# Patient Record
(demographics unavailable — no encounter records)

---

## 2025-05-19 NOTE — HISTORY OF PRESENT ILLNESS
[FreeTextEntry1] : 51year old female here for an initial consultation for her newly diagnosed left breast cancer. She was referred by her friend who is a patient of mine. Aziza lives in [place]. PCP: Dr. 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 She has hypercholesterolemia. She saw the cardiologist in [place] for surgical clearance, she was told she no longer need to be on the Plavix and is on a baby aspirin 81 mg.  12/22/2023 Bilateral mammogram: There are scattered areas of fibroglandular density.  No suspicious masses, microcalcifications or architectural distortion.  BI-RADS 2 1/3/2025 Bilateral mammogram: There are scattered areas of fibroglandular density.  Focal asymmetry in the left middle upper breast with associated architectural distortion.  No suspicious findings in the right breast.  BI-RADS 0, advise left diagnostic mammogram and breast ultrasound. 1/14/2025 Left diagnostic mammogram: There is a persistent spiculated nodule in the central breast corresponding to the recent finding.  The axilla is unremarkable.   Left breast ultrasound: 11-12:00, 6 cm from the nipple, irregular nonparallel shadowing mass corresponding to the mammographic finding, 10 x 10 x 7 mm, highly suspicious for malignancy.  There is an axillary node with focal asymmetric thickening of a portion of the cortex of 3 mm, nonspecific.  BI-RADS 5, advise left ultrasound-guided core biopsy 3/6/2025 Left breast stereotactic guided core biopsy (X shaped clip) because it could not be localized on ultrasound. Pathology: -Invasive carcinoma of no special type (ductal), grade 1 -Largest invasive focus in this limited sample: at least 10 mm -ER+(51-90%)/VA+(11-50%)/Her2- Recommend surgical and oncological evaluations. 4/21/2025 Bilateral breast MRI:  Right breast: No suspicious mass or focal non-mass enhancement from presumed background parenchymal enhancement of scattered enhancing foci.  No abnormal enlarged lymph nodes in the right axilla or internal mammary chain. Left breast: An 9 x 7 x 7 mm enhancing masses in the left central slightly upper breast, middle depth, and corresponds to the mass on mammogram, biopsy-proven invasive carcinoma.  Associated focus of susceptibility artifact is from the biopsy clip.  Kinetics demonstrate initial medium and delayed washout enhancement pattern.  No other suspicious mass or focal non-mass enhancement distinct from the presumed background parenchymal enhancement seen in the remaining breast.  No abnormal enlarged lymph nodes seen in the left axilla or internal mammary chain. BI-RADS 6 4/23/25 PET/CT: Minimal uptake of the posterior central left breast mass, biopsy-proven primary malignancy. Multiple non to minimally FDG avid left level 1-2 axillary nodes are indeterminate. No FDG avid distant metastases. Prior breast surgeon sent a Mammaprint on core biopsy because initially biomarkers were not available. That showed luminal B, high risk, with benefit from chemotherapy. 4/2/25 [employer] genetic testing: VUS BARD1 She was previously using Zepbound, she stopped medication 1 week ago.  She has seen a breast surgeon (Dr. Gonzales) and medical oncologist (Dr. Altman) in [place] where she currently resides. She has family here in [place]. She is scheduled for a lumpectomy, sentinel lymph node biopsy there on 6/18/2025. She is here for a 2nd opinion. She came alone.

## 2025-05-19 NOTE — CONSULT LETTER
[Dear  ___] : Dear  [unfilled], [Consult Letter:] : I had the pleasure of evaluating your patient, [unfilled]. [Please see my note below.] : Please see my note below. [Consult Closing:] : Thank you very much for allowing me to participate in the care of this patient.  If you have any questions, please do not hesitate to contact me. [Sincerely,] : Sincerely, [FreeTextEntry3] : Susan M. Palleschi, MD, FACS Division of Breast Surgery Director, Breast Surgery [address] [address] (Phone) [phone] (Fax) [phone]

## 2025-05-19 NOTE — HISTORY OF PRESENT ILLNESS
[FreeTextEntry1] : 51year old female here for an initial consultation for her newly diagnosed left breast cancer. She was referred by her friend who is a patient of mine. Aziza lives in [place]. PCP: Dr. 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 She has hypercholesterolemia. She saw the cardiologist in [place] for surgical clearance, she was told she no longer need to be on the Plavix and is on a baby aspirin 81 mg.  12/22/2023 Bilateral mammogram: There are scattered areas of fibroglandular density.  No suspicious masses, microcalcifications or architectural distortion.  BI-RADS 2 1/3/2025 Bilateral mammogram: There are scattered areas of fibroglandular density.  Focal asymmetry in the left middle upper breast with associated architectural distortion.  No suspicious findings in the right breast.  BI-RADS 0, advise left diagnostic mammogram and breast ultrasound. 1/14/2025 Left diagnostic mammogram: There is a persistent spiculated nodule in the central breast corresponding to the recent finding.  The axilla is unremarkable.   Left breast ultrasound: 11-12:00, 6 cm from the nipple, irregular nonparallel shadowing mass corresponding to the mammographic finding, 10 x 10 x 7 mm, highly suspicious for malignancy.  There is an axillary node with focal asymmetric thickening of a portion of the cortex of 3 mm, nonspecific.  BI-RADS 5, advise left ultrasound-guided core biopsy 3/6/2025 Left breast stereotactic guided core biopsy (X shaped clip) because it could not be localized on ultrasound. Pathology: -Invasive carcinoma of no special type (ductal), grade 1 -Largest invasive focus in this limited sample: at least 10 mm -ER+(51-90%)/SC+(11-50%)/Her2- Recommend surgical and oncological evaluations. 4/21/2025 Bilateral breast MRI:  Right breast: No suspicious mass or focal non-mass enhancement from presumed background parenchymal enhancement of scattered enhancing foci.  No abnormal enlarged lymph nodes in the right axilla or internal mammary chain. Left breast: An 9 x 7 x 7 mm enhancing masses in the left central slightly upper breast, middle depth, and corresponds to the mass on mammogram, biopsy-proven invasive carcinoma.  Associated focus of susceptibility artifact is from the biopsy clip.  Kinetics demonstrate initial medium and delayed washout enhancement pattern.  No other suspicious mass or focal non-mass enhancement distinct from the presumed background parenchymal enhancement seen in the remaining breast.  No abnormal enlarged lymph nodes seen in the left axilla or internal mammary chain. BI-RADS 6 4/23/25 PET/CT: Minimal uptake of the posterior central left breast mass, biopsy-proven primary malignancy. Multiple non to minimally FDG avid left level 1-2 axillary nodes are indeterminate. No FDG avid distant metastases. Prior breast surgeon sent a Mammaprint on core biopsy because initially biomarkers were not available. That showed luminal B, high risk, with benefit from chemotherapy. 4/2/25 [employer] genetic testing: VUS BARD1 She was previously using Zepbound, she stopped medication 1 week ago.  She has seen a breast surgeon (Dr. Gonzales) and medical oncologist (Dr. Altman) in [place] where she currently resides. She has family here in [place]. She is scheduled for a lumpectomy, sentinel lymph node biopsy there on 6/18/2025. She is here for a 2nd opinion. She came alone.

## 2025-05-19 NOTE — PHYSICAL EXAM
[Normocephalic] : normocephalic [Atraumatic] : atraumatic [EOMI] : extra ocular movement intact [Sclera nonicteric] : sclera nonicteric [Supple] : supple [No Supraclavicular Adenopathy] : no supraclavicular adenopathy [No Cervical Adenopathy] : no cervical adenopathy [No Thyromegaly] : no thyromegaly [No JVD] : no jugular venous distension [Clear to Auscultation Bilat] : clear to auscultation bilaterally [Normal Sinus Rhythm] : normal sinus rhythm [Normal S1, S2] : normal S1 and S2 [No Gallops] : no gallops [No Rubs] : no pericardial rub [Examined in the supine and seated position] : examined in the supine and seated position [Bra Size: ___] : Bra Size: [unfilled] [No dominant masses] : no dominant masses in right breast  [No dominant masses] : no dominant masses left breast [No Nipple Retraction] : no left nipple retraction [No Nipple Discharge] : no left nipple discharge [No Axillary Lymphadenopathy] : no left axillary lymphadenopathy [No Edema] : no edema [No Rashes] : no rashes [No Ulceration] : no ulceration [Asymmetrical] : asymmetrical [de-identified] : right breast is larger than left. Bilateral ptosis.  [de-identified] : Core scar: left 6:30 (N6cm)

## 2025-05-19 NOTE — CONSULT LETTER
[Dear  ___] : Dear  [unfilled], [Consult Letter:] : I had the pleasure of evaluating your patient, [unfilled]. [Please see my note below.] : Please see my note below. [Consult Closing:] : Thank you very much for allowing me to participate in the care of this patient.  If you have any questions, please do not hesitate to contact me. [Sincerely,] : Sincerely, [FreeTextEntry3] : Susan M. Palleschi, MD, FACS Division of Breast Surgery Director, Breast Surgery [address] [address] (Phone) [phone] (Fax) [phone]

## 2025-05-19 NOTE — CONSULT LETTER
[Dear  ___] : Dear  [unfilled], [Consult Letter:] : I had the pleasure of evaluating your patient, [unfilled]. [Please see my note below.] : Please see my note below. [Consult Closing:] : Thank you very much for allowing me to participate in the care of this patient.  If you have any questions, please do not hesitate to contact me. [Sincerely,] : Sincerely, [FreeTextEntry3] : Susan M. Palleschi, MD, FACS Division of Breast Surgery Director, Breast Surgery [address] [address] (Phone) [phone] (Fax) [phone]

## 2025-05-19 NOTE — ASSESSMENT
[FreeTextEntry1] : Newly diagnosed left breast invasive ductal carcinoma, grade 1, ER+/WA+/Her2- Clinical Stage I    1. The surgical treatment options of a left Linda  localization wide excision lumpectomy, left axillary sentinel lymph node (LN) biopsy, possible left axillary LN dissection followed by post-operative XRT (to reduce risk of local recurrence), vs.  left total mastectomy, left axillary sentinel LN biopsy, possible left axillary LN dissection, vs. bilateral total mastectomies, left axillary sentinel LN biopsy. injection of prophylactic breast with Magtrace, if she should so choose, were discussed in detail. The indications, alternatives, benefits and risks were discussed, including but not limited to bleeding, infection, pain, scar, swelling, numbness, change in breast appearance, recurrence, potential need for additional surgery and lymphedema. The breast cancer booklet, breast cancer treatment plan and postoperative instructions were reviewed and provided to the patient. 2. The lymphedema instruction sheet was reviewed and provided. 3. I discussed with her the potential for postoperative radiation therapy. 4. I discussed with her the potential for postoperative adjuvant therapy, including the possibility of chemotherapy and/or anti-hormonal therapy as indicated. 5. Slide review: to be requested preoperatively. 6. Bilateral breast MRI with IV contrast: done. 7. Genetic testing: advise comprehensive genetic testing. I discussed with her that if the genetic testing reveals a breast cancer mutation, I will discuss with her the potential for bilateral mastectomies. [facility] Comprehensive genetic testing drawn today. I will call her with results. 8. Reconstruction: discussed with her the option of reconstruction if she undergoes mastectomy(ies). 9. Regarding the surgical management of her axilla: The SOUND trial was published in [employer] On[employer] 9/21/2023 concluded that patients with small breast cancer (<2cm) and sonographically normal appearing lymph nodes can be safely spared any axillary surgery whenever the lack of pathological information does not affect the postoperative treatment plan.  Furthermore, regarding axillary management, an additional study, INSEMA, published in the [place] Journal of Medicine on 12/24/2024 concluded that omitting axillary sentinel lymph node surgery was not inferior to sentinel lymph node surgery and clinically node-negative patients with early breast cancer having upfront breast conserving therapy.  [place] lymph node surgery omission was felt to be most suitable for patients greater than 50 years old with cancers that were hormone receptor positive, HER2/nat negative, grade 1-2 and up to 2 cm in size, T1. 10. I discussed with her that at our [facility] tumor board, we discussed incorporating the above axillary surgery guidelines for patients > 65 as we await more long term data. 11.  She brought the outside images on CD.  Our office was not able to upload the images.  We will send them to [facility] breast imaging at [address] [place] [address] to request that they try to upload the images into the [facility] system. 12. On her left diagnostic mammogram and left breast US, there was note of an axillary node with focal asymmetric thickening of a portion of the cortex of 3 mm, nonspecific. On MRI, and PET, there were no abnormal enlarged lymph nodes seen.  If she elects to proceed with surgery with me, I will request the radiologist at [facility] to review the images to deem if an ultrasound biopsy of the lymph node is warranted. 13. If she were to undergo breast conserving therapy, she would be a candidate for bilateral oncoplastic reduction. I will refer her to Dr. Lauren Shikowitz-Behr.  14. Patient states she will let me know if she will undergo surgery in [place], or if she will proceed with surgery with me.   This patient encounter took 90 minutes today to perform records review, chart preparation, clinical encounter, coordination of care and documentation.    The patient was seen and examined in the presence of Kathleen Long NP.

## 2025-05-19 NOTE — ASSESSMENT
[FreeTextEntry1] : Newly diagnosed left breast invasive ductal carcinoma, grade 1, ER+/NH+/Her2- Clinical Stage I    1. The surgical treatment options of a left Linda  localization wide excision lumpectomy, left axillary sentinel lymph node (LN) biopsy, possible left axillary LN dissection followed by post-operative XRT (to reduce risk of local recurrence), vs.  left total mastectomy, left axillary sentinel LN biopsy, possible left axillary LN dissection, vs. bilateral total mastectomies, left axillary sentinel LN biopsy. injection of prophylactic breast with Magtrace, if she should so choose, were discussed in detail. The indications, alternatives, benefits and risks were discussed, including but not limited to bleeding, infection, pain, scar, swelling, numbness, change in breast appearance, recurrence, potential need for additional surgery and lymphedema. The breast cancer booklet, breast cancer treatment plan and postoperative instructions were reviewed and provided to the patient. 2. The lymphedema instruction sheet was reviewed and provided. 3. I discussed with her the potential for postoperative radiation therapy. 4. I discussed with her the potential for postoperative adjuvant therapy, including the possibility of chemotherapy and/or anti-hormonal therapy as indicated. 5. Slide review: to be requested preoperatively. 6. Bilateral breast MRI with IV contrast: done. 7. Genetic testing: advise comprehensive genetic testing. I discussed with her that if the genetic testing reveals a breast cancer mutation, I will discuss with her the potential for bilateral mastectomies. [place] Comprehensive genetic testing drawn today. I will call her with results. 8. Reconstruction: discussed with her the option of reconstruction if she undergoes mastectomy(ies). 9. Regarding the surgical management of her axilla: The SOUND trial was published in [employer] On[employer] 9/21/2023 concluded that patients with small breast cancer (<2cm) and sonographically normal appearing lymph nodes can be safely spared any axillary surgery whenever the lack of pathological information does not affect the postoperative treatment plan.  Furthermore, regarding axillary management, an additional study, INSEMA, published in the [place] Journal of Medicine on 12/24/2024 concluded that omitting axillary sentinel lymph node surgery was not inferior to sentinel lymph node surgery and clinically node-negative patients with early breast cancer having upfront breast conserving therapy.  [place] lymph node surgery omission was felt to be most suitable for patients greater than 50 years old with cancers that were hormone receptor positive, HER2/nat negative, grade 1-2 and up to 2 cm in size, T1. 10. I discussed with her that at our [facility] tumor board, we discussed incorporating the above axillary surgery guidelines for patients > 65 as we await more long term data. 11.  She brought the outside images on CD.  Our office was not able to upload the images.  We will send them to [facility] breast imaging at [address] [place] [address] to request that they try to upload the images into the [facility] system. 12. On her left diagnostic mammogram and left breast US, there was note of an axillary node with focal asymmetric thickening of a portion of the cortex of 3 mm, nonspecific. On MRI, and PET, there were no abnormal enlarged lymph nodes seen.  If she elects to proceed with surgery with me, I will request the radiologist at [facility] to review the images to deem if an ultrasound biopsy of the lymph node is warranted. 13. If she were to undergo breast conserving therapy, she would be a candidate for bilateral oncoplastic reduction. I will refer her to Dr. Lauren Shikowitz-Behr.  14. Patient states she will let me know if she will undergo surgery in [place], or if she will proceed with surgery with me.   This patient encounter took 90 minutes today to perform records review, chart preparation, clinical encounter, coordination of care and documentation.    The patient was seen and examined in the presence of Kathleen Long NP.

## 2025-05-19 NOTE — PHYSICAL EXAM
[Normocephalic] : normocephalic [Atraumatic] : atraumatic [EOMI] : extra ocular movement intact [Sclera nonicteric] : sclera nonicteric [Supple] : supple [No Supraclavicular Adenopathy] : no supraclavicular adenopathy [No Cervical Adenopathy] : no cervical adenopathy [No Thyromegaly] : no thyromegaly [No JVD] : no jugular venous distension [Clear to Auscultation Bilat] : clear to auscultation bilaterally [Normal Sinus Rhythm] : normal sinus rhythm [Normal S1, S2] : normal S1 and S2 [No Gallops] : no gallops [No Rubs] : no pericardial rub [Examined in the supine and seated position] : examined in the supine and seated position [Bra Size: ___] : Bra Size: [unfilled] [No dominant masses] : no dominant masses in right breast  [No dominant masses] : no dominant masses left breast [No Nipple Retraction] : no left nipple retraction [No Nipple Discharge] : no left nipple discharge [No Axillary Lymphadenopathy] : no left axillary lymphadenopathy [No Edema] : no edema [No Rashes] : no rashes [No Ulceration] : no ulceration [Asymmetrical] : asymmetrical [de-identified] : right breast is larger than left. Bilateral ptosis.  [de-identified] : Core scar: left 6:30 (N6cm)

## 2025-05-19 NOTE — PHYSICAL EXAM
[Normocephalic] : normocephalic [Atraumatic] : atraumatic [EOMI] : extra ocular movement intact [Sclera nonicteric] : sclera nonicteric [Supple] : supple [No Supraclavicular Adenopathy] : no supraclavicular adenopathy [No Cervical Adenopathy] : no cervical adenopathy [No Thyromegaly] : no thyromegaly [No JVD] : no jugular venous distension [Clear to Auscultation Bilat] : clear to auscultation bilaterally [Normal Sinus Rhythm] : normal sinus rhythm [Normal S1, S2] : normal S1 and S2 [No Gallops] : no gallops [No Rubs] : no pericardial rub [Examined in the supine and seated position] : examined in the supine and seated position [Bra Size: ___] : Bra Size: [unfilled] [No dominant masses] : no dominant masses in right breast  [No dominant masses] : no dominant masses left breast [No Nipple Retraction] : no left nipple retraction [No Nipple Discharge] : no left nipple discharge [No Axillary Lymphadenopathy] : no left axillary lymphadenopathy [No Edema] : no edema [No Rashes] : no rashes [No Ulceration] : no ulceration [Asymmetrical] : asymmetrical [de-identified] : right breast is larger than left. Bilateral ptosis.  [de-identified] : Core scar: left 6:30 (N6cm)

## 2025-05-19 NOTE — HISTORY OF PRESENT ILLNESS
[FreeTextEntry1] : 51year old female here for an initial consultation for her newly diagnosed left breast cancer. She was referred by her friend who is a patient of mine. Aziza lives in [place]. PCP: Dr. 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 She has hypercholesterolemia. She saw the cardiologist in [place] for surgical clearance, she was told she no longer need to be on the Plavix and is on a baby aspirin 81 mg.  12/22/2023 Bilateral mammogram: There are scattered areas of fibroglandular density.  No suspicious masses, microcalcifications or architectural distortion.  BI-RADS 2 1/3/2025 Bilateral mammogram: There are scattered areas of fibroglandular density.  Focal asymmetry in the left middle upper breast with associated architectural distortion.  No suspicious findings in the right breast.  BI-RADS 0, advise left diagnostic mammogram and breast ultrasound. 1/14/2025 Left diagnostic mammogram: There is a persistent spiculated nodule in the central breast corresponding to the recent finding.  The axilla is unremarkable.   Left breast ultrasound: 11-12:00, 6 cm from the nipple, irregular nonparallel shadowing mass corresponding to the mammographic finding, 10 x 10 x 7 mm, highly suspicious for malignancy.  There is an axillary node with focal asymmetric thickening of a portion of the cortex of 3 mm, nonspecific.  BI-RADS 5, advise left ultrasound-guided core biopsy 3/6/2025 Left breast stereotactic guided core biopsy (X shaped clip) because it could not be localized on ultrasound. Pathology: -Invasive carcinoma of no special type (ductal), grade 1 -Largest invasive focus in this limited sample: at least 10 mm -ER+(51-90%)/ID+(11-50%)/Her2- Recommend surgical and oncological evaluations. 4/21/2025 Bilateral breast MRI:  Right breast: No suspicious mass or focal non-mass enhancement from presumed background parenchymal enhancement of scattered enhancing foci.  No abnormal enlarged lymph nodes in the right axilla or internal mammary chain. Left breast: An 9 x 7 x 7 mm enhancing masses in the left central slightly upper breast, middle depth, and corresponds to the mass on mammogram, biopsy-proven invasive carcinoma.  Associated focus of susceptibility artifact is from the biopsy clip.  Kinetics demonstrate initial medium and delayed washout enhancement pattern.  No other suspicious mass or focal non-mass enhancement distinct from the presumed background parenchymal enhancement seen in the remaining breast.  No abnormal enlarged lymph nodes seen in the left axilla or internal mammary chain. BI-RADS 6 4/23/25 PET/CT: Minimal uptake of the posterior central left breast mass, biopsy-proven primary malignancy. Multiple non to minimally FDG avid left level 1-2 axillary nodes are indeterminate. No FDG avid distant metastases. Prior breast surgeon sent a Mammaprint on core biopsy because initially biomarkers were not available. That showed luminal B, high risk, with benefit from chemotherapy. 4/2/25 [employer] genetic testing: VUS BARD1 She was previously using Zepbound, she stopped medication 1 week ago.  She has seen a breast surgeon (Dr. Gonzales) and medical oncologist (Dr. Altman) in [place] where she currently resides. She has family here in [place]. She is scheduled for a lumpectomy, sentinel lymph node biopsy there on 6/18/2025. She is here for a 2nd opinion. She came alone.

## 2025-05-19 NOTE — ASSESSMENT
[FreeTextEntry1] : Newly diagnosed left breast invasive ductal carcinoma, grade 1, ER+/[place]+/Her2- Clinical Stage I    1. The surgical treatment options of a left Linda  localization wide excision lumpectomy, left axillary sentinel lymph node (LN) biopsy, possible left axillary LN dissection followed by post-operative XRT (to reduce risk of local recurrence), vs.  left total mastectomy, left axillary sentinel LN biopsy, possible left axillary LN dissection, vs. bilateral total mastectomies, left axillary sentinel LN biopsy. injection of prophylactic breast with Magtrace, if she should so choose, were discussed in detail. The indications, alternatives, benefits and risks were discussed, including but not limited to bleeding, infection, pain, scar, swelling, numbness, change in breast appearance, recurrence, potential need for additional surgery and lymphedema. The breast cancer booklet, breast cancer treatment plan and postoperative instructions were reviewed and provided to the patient. 2. The lymphedema instruction sheet was reviewed and provided. 3. I discussed with her the potential for postoperative radiation therapy. 4. I discussed with her the potential for postoperative adjuvant therapy, including the possibility of chemotherapy and/or anti-hormonal therapy as indicated. 5. Slide review: to be requested preoperatively. 6. Bilateral breast MRI with IV contrast: done. 7. Genetic testing: advise comprehensive genetic testing. I discussed with her that if the genetic testing reveals a breast cancer mutation, I will discuss with her the potential for bilateral mastectomies. [facility] Comprehensive genetic testing drawn today. I will call her with results. 8. Reconstruction: discussed with her the option of reconstruction if she undergoes mastectomy(ies). 9. Regarding the surgical management of her axilla: The SOUND trial was published in [employer] 9/21/2023 concluded that patients with small breast cancer (<2cm) and sonographically normal appearing lymph nodes can be safely spared any axillary surgery whenever the lack of pathological information does not affect the postoperative treatment plan.  Furthermore, regarding axillary management, an additional study, INSEMA, published in the [place] Journal of Medicine on 12/24/2024 concluded that omitting axillary sentinel lymph node surgery was not inferior to sentinel lymph node surgery and clinically node-negative patients with early breast cancer having upfront breast conserving therapy.  [place] lymph node surgery omission was felt to be most suitable for patients greater than 50 years old with cancers that were hormone receptor positive, HER2/nat negative, grade 1-2 and up to 2 cm in size, T1. 10. I discussed with her that at our [facility] tumor board, we discussed incorporating the above axillary surgery guidelines for patients > 65 as we await more long term data. 11.  She brought the outside images on CD.  Our office was not able to upload the images.  We will send them to [facility] breast imaging at [address] [place] [address] to request that they try to upload the images into the [facility] system. 12. On her left diagnostic mammogram and left breast US, there was note of an axillary node with focal asymmetric thickening of a portion of the cortex of 3 mm, nonspecific. On MRI, and PET, there were no abnormal enlarged lymph nodes seen.  If she elects to proceed with surgery with me, I will request the radiologist at [facility] to review the images to deem if an ultrasound biopsy of the lymph node is warranted. 13. If she were to undergo breast conserving therapy, she would be a candidate for bilateral oncoplastic reduction. I will refer her to Dr. Lauren Shikowitz-Behr.  14. Patient states she will let me know if she will undergo surgery in [place], or if she will proceed with surgery with me.   This patient encounter took 90 minutes today to perform records review, chart preparation, clinical encounter, coordination of care and documentation.    The patient was seen and examined in the presence of Kathleen Long NP.

## 2025-05-21 NOTE — PHYSICAL EXAM
[NI] : Normal [de-identified] : NAD, AxOx3  [de-identified] : nonlabored breathing  [de-identified] : RIGHT: SN-N  41.5 , N-IMF 14.5  , BW 16 LEFT: SN-N  42  , N-IMF 13  , BW 15 no masses  no nipple discharge nor retraction grade 3 ptosis bilaterally  breast asymmetry with right >left [de-identified] : no edema  [de-identified] : grossly intact  [de-identified] : normal affect

## 2025-05-21 NOTE — HISTORY OF PRESENT ILLNESS
[FreeTextEntry1] : TUSHAR ORELLANA is a 51 year female presenting to the office today with newly diagnosed left breast cancer. Patient is a candidate for BCT and is here today to discuss her oncoplastic options at time of lumpectomy. Patient lives in [place] and was referred by a patient of Dr. Palleschi's. She does have a surgical date in [place] for 25.  PMHx- CAD, HLD, Right BBB PSHx- Cardiac stent x1 ,tonsillectomy,  Allergies- denies  Denies tobacco use Meds: ASA 81mg , rosuvastatin Family history significant for sister with breast cancer, diagnosed at 40, s/p lumpectomy for DCIS, then recurrence of DCIS at age 49 s/p bilateral mastectomies with reconstruction. Grandmother and aunt Bra size 46 DD

## 2025-07-07 NOTE — ASSESSMENT
[FreeTextEntry1] : Newly diagnosed left breast invasive ductal carcinoma, grade 1, ER+/[place]+/Her2- Clinical Stage I    1. Pathology reviewed with patient, copy provided 2. Follow up office visit due 3. Advised monthly self breast examinations and advised her to contact me if she has any concerns. 4. Annual bilateral mammogram and breast ultrasound due 5. Medical Oncology: I will refer her to 6. Radiation Oncology: I will refer her to

## 2025-07-07 NOTE — HISTORY OF PRESENT ILLNESS
[FreeTextEntry1] : 51 year old female here for a post-op visit. She was referred by her friend who is a patient of mine. Aziza lives in [place]. PCP: Dr. Stephanie Casalman ([place]) Family history of breast cancer in her sister diagnosed at age 40, s/p lumpectomy for DCIS; She then had a recurrence of DCIS at age 49 and underwent bilateral mastectomies with reconstruction. 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 She has hypercholesterolemia. She saw the cardiologist in [place] for surgical clearance, she was told she no longer need to be on the Plavix and is on a baby aspirin 81 mg.  12/22/2023 Bilateral mammogram: There are scattered areas of fibroglandular density.  No suspicious masses, microcalcifications or architectural distortion.  BI-RADS 2 1/3/2025 Bilateral mammogram: There are scattered areas of fibroglandular density.  Focal asymmetry in the left middle upper breast with associated architectural distortion.  No suspicious findings in the right breast.  BI-RADS 0, advise left diagnostic mammogram and breast ultrasound. 1/14/2025 Left diagnostic mammogram: There is a persistent spiculated nodule in the central breast corresponding to the recent finding.  The axilla is unremarkable.   Left breast ultrasound: 11-12:00, 6 cm from the nipple, irregular nonparallel shadowing mass corresponding to the mammographic finding, 10 x 10 x 7 mm, highly suspicious for malignancy.  There is an axillary node with focal asymmetric thickening of a portion of the cortex of 3 mm, nonspecific.  BI-RADS 5, advise left ultrasound-guided core biopsy 3/6/2025 Left breast stereotactic guided core biopsy (X shaped clip) because it could not be localized on ultrasound. Pathology: -Invasive carcinoma of no special type (ductal), grade 1 -Largest invasive focus in this limited sample: at least 10 mm -ER+(51-90%)/MA+(11-50%)/Her2- Recommend surgical and oncological evaluations. 4/21/2025 Bilateral breast MRI:  Right breast: No suspicious mass or focal non-mass enhancement from presumed background parenchymal enhancement of scattered enhancing foci.  No abnormal enlarged lymph nodes in the right axilla or internal mammary chain. Left breast: An 9 x 7 x 7 mm enhancing masses in the left central slightly upper breast, middle depth, and corresponds to the mass on mammogram, biopsy-proven invasive carcinoma.  Associated focus of susceptibility artifact is from the biopsy clip.  Kinetics demonstrate initial medium and delayed washout enhancement pattern.  No other suspicious mass or focal non-mass enhancement distinct from the presumed background parenchymal enhancement seen in the remaining breast.  No abnormal enlarged lymph nodes seen in the left axilla or internal mammary chain. BI-RADS 6 4/23/25 PET/CT: Minimal uptake of the posterior central left breast mass, biopsy-proven primary malignancy. Multiple non to minimally FDG avid left level 1-2 axillary nodes are indeterminate. No FDG avid distant metastases. Prior breast surgeon sent a Mammaprint on core biopsy because initially biomarkers were not available. That showed luminal B, high risk, with benefit from chemotherapy. 4/2/25 [employer] genetic testing: VUS BARD1 She was previously using Zepbound, she stopped medication in 5/2025. 6/9/2025 US guided core needle biopsy of left axilla: Twirl shaped clip placed in left axilla. 1. Axilla, left, core biopsy:  - Benign lymph node 6/24/2025 Breast, left central, mass, core biopsy: - Invasive ductal carcinoma (see comment). - It is limited sample, the invasive tumor is Norman grade 1 and measures at least 10.0 mm. ER+/MA+/Her2 neg 1+ 6/26/2025 Left 12:00 Linda  localization wide excision lumpectomy (invasive breast cancer), Left axillary sentinel LN biopsy: Surgical pathology pending. Prior to her consultation with me on 5/19/25, she had seen a breast surgeon (Dr. Gonzales) and medical oncologist (Dr. Altman) in [place] where she currently resides. She has family here in [place]. She was scheduled for a lumpectomy, sentinel lymph node biopsy there on 6/18/2025. Oncotype Dx 30, was sent on core biopsy.

## 2025-07-07 NOTE — CONSULT LETTER
[Dear  ___] : Dear  [unfilled], [Consult Letter:] : I had the pleasure of evaluating your patient, [unfilled]. [Please see my note below.] : Please see my note below. [Consult Closing:] : Thank you very much for allowing me to participate in the care of this patient.  If you have any questions, please do not hesitate to contact me. [Sincerely,] : Sincerely, [FreeTextEntry3] : Susan M. Palleschi, MD, FACS Division of Breast Surgery Director, Breast Surgery [address] [address] (Phone) [phone] (Fax) [phone]

## 2025-07-07 NOTE — PHYSICAL EXAM
[Normocephalic] : normocephalic [Atraumatic] : atraumatic [EOMI] : extra ocular movement intact [Sclera nonicteric] : sclera nonicteric [Supple] : supple [No Supraclavicular Adenopathy] : no supraclavicular adenopathy [No Cervical Adenopathy] : no cervical adenopathy [No Thyromegaly] : no thyromegaly [No JVD] : no jugular venous distension [Clear to Auscultation Bilat] : clear to auscultation bilaterally [Normal Sinus Rhythm] : normal sinus rhythm [Normal S1, S2] : normal S1 and S2 [No Gallops] : no gallops [No Rubs] : no pericardial rub [Examined in the supine and seated position] : examined in the supine and seated position [Asymmetrical] : asymmetrical [Bra Size: ___] : Bra Size: [unfilled] [No dominant masses] : no dominant masses in right breast  [No dominant masses] : no dominant masses left breast [No Nipple Retraction] : no left nipple retraction [No Nipple Discharge] : no left nipple discharge [No Axillary Lymphadenopathy] : no left axillary lymphadenopathy [No Edema] : no edema [No Rashes] : no rashes [No Ulceration] : no ulceration [de-identified] : right breast is larger than left. Bilateral ptosis.  [de-identified] : Core scar: left 6:30 (N6cm)

## 2025-07-09 NOTE — HISTORY OF PRESENT ILLNESS
[FreeTextEntry1] : 51 year old female here for a post-op visit. She was referred by her friend who is a patient of mine. Aziza lives in [place]. PCP: Dr. Stephanie Casalman ([place]) She was previously using Zepbound, she stopped medication in 5/2025. Family history of breast cancer in her sister diagnosed at age 40, s/p lumpectomy. She then had a recurrence at age 49 and underwent bilateral mastectomies with reconstruction. 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 She has hypercholesterolemia. She saw the cardiologist in [place] for surgical clearance, she was told she no longer need to be on the Plavix and is on a baby aspirin 81 mg.  12/22/2023 Bilateral mammogram: There are scattered areas of fibroglandular density.  No suspicious masses, microcalcifications or architectural distortion.  BI-RADS 2 1/3/2025 Bilateral mammogram: There are scattered areas of fibroglandular density.  Focal asymmetry in the left middle upper breast with associated architectural distortion.  No suspicious findings in the right breast.  BI-RADS 0, advise left diagnostic mammogram and breast ultrasound. 1/14/2025 Left diagnostic mammogram: There is a persistent spiculated nodule in the central breast corresponding to the recent finding.  The axilla is unremarkable.   Left breast ultrasound: 11-12:00, 6 cm from the nipple, irregular nonparallel shadowing mass corresponding to the mammographic finding, 10 x 10 x 7 mm, highly suspicious for malignancy.  There is an axillary node with focal asymmetric thickening of a portion of the cortex of 3 mm, nonspecific.  BI-RADS 5, advise left ultrasound-guided core biopsy 3/6/2025 Left breast stereotactic guided core biopsy (X shaped clip) because it could not be localized on ultrasound. Pathology: -Invasive carcinoma of no special type (ductal), grade 1 -Largest invasive focus in this limited sample: at least 10 mm -ER+(51-90%)/[place]+(11-50%)/Her2- Recommend surgical and oncological evaluations. 4/21/2025 Bilateral breast MRI:  Right breast: No suspicious mass or focal non-mass enhancement from presumed background parenchymal enhancement of scattered enhancing foci.  No abnormal enlarged lymph nodes in the right axilla or internal mammary chain. Left breast: An 9 x 7 x 7 mm enhancing masses in the left central slightly upper breast, middle depth, and corresponds to the mass on mammogram, biopsy-proven invasive carcinoma.  Associated focus of susceptibility artifact is from the biopsy clip.  Kinetics demonstrate initial medium and delayed washout enhancement pattern.  No other suspicious mass or focal non-mass enhancement distinct from the presumed background parenchymal enhancement seen in the remaining breast.  No abnormal enlarged lymph nodes seen in the left axilla or internal mammary chain. BI-RADS 6 4/23/25 PET/CT: Minimal uptake of the posterior central left breast mass, biopsy-proven primary malignancy. Multiple non to minimally FDG avid left level 1-2 axillary nodes are indeterminate. No FDG avid distant metastases. 4/2/25 [employer] genetic testing: VUS BARD1 5/19/25 She presented to me for a 2nd opinion. I arranged review of her images and she underwent a left axillary LN US biopsy. 6/9/2025 US guided core needle biopsy of left axilla: Twirl shaped clip placed in left axilla. 1. Axilla, left, core biopsy:  - Benign lymph node 6/26/2025 Left 12:00 Linda  localization wide excision lumpectomy (invasive breast cancer), Left axillary sentinel LN biopsy: Surgical pathology pending. Prior to her consultation with me on 5/19/25, she had seen a breast surgeon (Dr. Gonzales) and medical oncologist (Dr. Brigitte Altman) in [place] where she currently resides. She has family here in [place]. She was scheduled for a lumpectomy, sentinel lymph node biopsy there on 6/18/2025.  6/26/25 left 12:00 Linda  localization wide excision lumpectomy, left axillary sentinel lymph node biopsy. Surgical pathology revealed: 1. Left axillary sentinel lymph node #1, biopsy: -One (1) lymph node with biopsy site changes, no tumor identified 2. Left axillary sentinel lymph node #2, biopsy: -One (1) lymph node, no tumor identified 3. 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 Left breast skin scar @ 6:30, excision: -Skin with scar Prior breast surgeon sent a Mammaprint on core biopsy because initially biomarkers were not available. That showed luminal B, high risk, with benefit from chemotherapy. Oncotype Dx 30 (sent on core biopsy). She states she noted swelling left breast; she spoke to Dr. Marquez (who was covering for me) and she told her it was consistent with a seroma. She is here with her niece, Yovana.

## 2025-07-09 NOTE — CONSULT LETTER
[Consult Letter:] : I had the pleasure of evaluating your patient, [unfilled]. [FreeTextEntry3] : Susan M. Palleschi, MD, FACS Division of Breast Surgery Director, Breast Surgery [address] [address] (Phone) [phone] (Fax) [phone]  [DrLeonardo  ___] : Dr. DUNBAR

## 2025-07-09 NOTE — ASSESSMENT
[FreeTextEntry1] : s/p left lumpectomy, left axillary sentinel LN biopsy left breast invasive ductal carcinoma, grade 2, ER+/[place]+/Her2- Stage I    1. Pathology reviewed with patient, copy provided 2. Follow up office visit 8/2025 3. Advised monthly self breast examinations and advised her to contact me if she has any concerns. 4. Annual bilateral mammogram and breast ultrasound due 5. Medical Oncology: She will undergo chemotherapy with Dr. Brigitte Altman in [place] 6. Radiation Oncology: She will undergo radiation therapy in [place] 7. Bilateral mammogram and bilateral breast US due 1/2026.

## 2025-07-09 NOTE — PHYSICAL EXAM
[de-identified] : palpable left 11:00 (N11cm) breast mass c/w seroma; left periareolar incision clean/dry/intact; no evidence of infection or hematoma.  [de-identified] : palpable left axillary seroma 4 x 2 cm. Incision healing well.